# Patient Record
Sex: FEMALE | Race: WHITE | NOT HISPANIC OR LATINO | URBAN - METROPOLITAN AREA
[De-identification: names, ages, dates, MRNs, and addresses within clinical notes are randomized per-mention and may not be internally consistent; named-entity substitution may affect disease eponyms.]

---

## 2022-04-28 ENCOUNTER — OFFICE VISIT (OUTPATIENT)
Dept: URBAN - METROPOLITAN AREA CLINIC 60 | Facility: CLINIC | Age: 42
End: 2022-04-28

## 2022-06-07 ENCOUNTER — OFFICE VISIT (OUTPATIENT)
Dept: URBAN - METROPOLITAN AREA SURGERY CENTER 4 | Facility: SURGERY CENTER | Age: 42
End: 2022-06-07

## 2022-06-13 LAB — PATHOLOGY (INDENTED REPORT): (no result)

## 2022-06-23 ENCOUNTER — OFFICE VISIT (OUTPATIENT)
Dept: URBAN - METROPOLITAN AREA CLINIC 60 | Facility: CLINIC | Age: 42
End: 2022-06-23

## 2022-07-09 ENCOUNTER — TELEPHONE ENCOUNTER (OUTPATIENT)
Dept: URBAN - METROPOLITAN AREA CLINIC 121 | Facility: CLINIC | Age: 42
End: 2022-07-09

## 2022-07-09 RX ORDER — IBUPROFEN 200 MG/1
CAPSULE, LIQUID FILLED ORAL
Refills: 0 | OUTPATIENT
Start: 2022-04-28 | End: 2022-06-23

## 2022-07-09 RX ORDER — LEUPROLIDE ACETATE 11.25 MG
KIT INTRAMUSCULAR
Refills: 0 | OUTPATIENT
Start: 2022-04-28 | End: 2022-06-23

## 2022-07-09 RX ORDER — NORETHINDRONE ACETATE 5 MG/1
TABLET ORAL
Refills: 0 | OUTPATIENT
Start: 2022-04-28 | End: 2022-06-23

## 2022-07-09 RX ORDER — LEUPROLIDE ACETATE 3.75 MG
KIT INTRAMUSCULAR
Refills: 0 | OUTPATIENT
Start: 2022-04-28 | End: 2022-04-28

## 2022-07-09 RX ORDER — PROMETHAZINE HYDROCHLORIDE 25 MG/1
TABLET ORAL
Refills: 0 | OUTPATIENT
Start: 2022-04-28 | End: 2022-06-23

## 2022-07-09 RX ORDER — NORETHINDRONE ACETATE 5 MG/1
TABLET ORAL
Refills: 0 | OUTPATIENT
Start: 2022-06-23 | End: 2022-06-23

## 2022-07-09 RX ORDER — OXYCODONE AND ACETAMINOPHEN 5; 325 MG/1; MG/1
TABLET ORAL
Refills: 0 | OUTPATIENT
Start: 2022-04-28 | End: 2022-04-28

## 2022-07-09 RX ORDER — PANTOPRAZOLE SODIUM 40 MG/1
TWICE A DAY TABLET, DELAYED RELEASE ORAL TWICE A DAY
Refills: 1 | OUTPATIENT
Start: 2022-04-28 | End: 2022-06-19

## 2022-07-09 RX ORDER — LORAZEPAM 1 MG/1
TABLET ORAL
Refills: 0 | OUTPATIENT
Start: 2022-04-28 | End: 2022-06-23

## 2022-07-09 RX ORDER — PANTOPRAZOLE SODIUM 40 MG/1
TABLET, DELAYED RELEASE ORAL
Refills: 0 | OUTPATIENT
Start: 2022-04-28 | End: 2022-06-23

## 2022-07-10 ENCOUNTER — TELEPHONE ENCOUNTER (OUTPATIENT)
Dept: URBAN - METROPOLITAN AREA CLINIC 121 | Facility: CLINIC | Age: 42
End: 2022-07-10

## 2022-07-10 RX ORDER — IBUPROFEN 200 MG/1
CAPSULE, LIQUID FILLED ORAL
Refills: 0 | Status: ACTIVE | COMMUNITY
Start: 2022-06-23

## 2022-07-10 RX ORDER — PANTOPRAZOLE SODIUM 40 MG/1
ONCE A DAY TABLET, DELAYED RELEASE ORAL ONCE A DAY
Refills: 1 | Status: ACTIVE | COMMUNITY
Start: 2022-06-19

## 2022-07-10 RX ORDER — LORAZEPAM 1 MG/1
TABLET ORAL
Refills: 0 | Status: ACTIVE | COMMUNITY
Start: 2022-06-23

## 2022-07-10 RX ORDER — PANTOPRAZOLE SODIUM 40 MG/1
TWICE A DAY 30-60 MINUTES BEFORE BREAKFAST, AND AT BEDTIME TABLET, DELAYED RELEASE ORAL TWICE A DAY
Refills: 4 | Status: ACTIVE | COMMUNITY
Start: 2022-04-28

## 2022-07-10 RX ORDER — LEUPROLIDE ACETATE 11.25 MG
KIT INTRAMUSCULAR
Refills: 0 | Status: ACTIVE | COMMUNITY
Start: 2022-06-23

## 2022-07-10 RX ORDER — PROMETHAZINE HYDROCHLORIDE 25 MG/1
TABLET ORAL
Refills: 0 | Status: ACTIVE | COMMUNITY
Start: 2022-06-23

## 2022-12-22 ENCOUNTER — OFFICE VISIT (OUTPATIENT)
Dept: URBAN - METROPOLITAN AREA CLINIC 60 | Facility: CLINIC | Age: 42
End: 2022-12-22
Payer: COMMERCIAL

## 2022-12-22 ENCOUNTER — WEB ENCOUNTER (OUTPATIENT)
Dept: URBAN - METROPOLITAN AREA CLINIC 60 | Facility: CLINIC | Age: 42
End: 2022-12-22

## 2022-12-22 VITALS
HEART RATE: 102 BPM | OXYGEN SATURATION: 97 % | WEIGHT: 208 LBS | TEMPERATURE: 98.1 F | SYSTOLIC BLOOD PRESSURE: 122 MMHG | DIASTOLIC BLOOD PRESSURE: 78 MMHG | HEIGHT: 64 IN | BODY MASS INDEX: 35.51 KG/M2

## 2022-12-22 DIAGNOSIS — R10.13 DYSPEPSIA: ICD-10-CM

## 2022-12-22 DIAGNOSIS — R10.84 GENERALIZED ABDOMINAL PAIN: ICD-10-CM

## 2022-12-22 DIAGNOSIS — R19.7 DIARRHEA, UNSPECIFIED TYPE: ICD-10-CM

## 2022-12-22 PROCEDURE — 99214 OFFICE O/P EST MOD 30 MIN: CPT | Performed by: NURSE PRACTITIONER

## 2022-12-22 RX ORDER — LORAZEPAM 1 MG/1
TABLET ORAL
Refills: 0 | Status: ACTIVE | COMMUNITY
Start: 2022-06-23

## 2022-12-22 RX ORDER — PANTOPRAZOLE SODIUM 40 MG/1
TWICE A DAY 30-60 MINUTES BEFORE BREAKFAST, AND AT BEDTIME TABLET, DELAYED RELEASE ORAL TWICE A DAY
Refills: 4 | Status: ON HOLD | COMMUNITY
Start: 2022-04-28

## 2022-12-22 RX ORDER — DICYCLOMINE HYDROCHLORIDE 10 MG/1
1 CAPSULE CAPSULE ORAL
Qty: 120 CAPSULES | Refills: 1 | OUTPATIENT
Start: 2022-12-22 | End: 2023-02-20

## 2022-12-22 RX ORDER — PROMETHAZINE HYDROCHLORIDE 25 MG/1
TABLET ORAL
Refills: 0 | Status: ACTIVE | COMMUNITY
Start: 2022-06-23

## 2022-12-22 RX ORDER — LEUPROLIDE ACETATE 11.25 MG
KIT INTRAMUSCULAR
Refills: 0 | Status: ACTIVE | COMMUNITY
Start: 2022-06-23

## 2022-12-22 RX ORDER — IBUPROFEN 200 MG/1
CAPSULE, LIQUID FILLED ORAL
Refills: 0 | Status: ACTIVE | COMMUNITY
Start: 2022-06-23

## 2022-12-22 RX ORDER — PANTOPRAZOLE SODIUM 40 MG/1
1 TABLET TABLET, DELAYED RELEASE ORAL ONCE A DAY
Qty: 90 | Refills: 1 | OUTPATIENT
Start: 2022-12-22

## 2022-12-22 NOTE — HPI-TODAY'S VISIT:
Ms. Herrera is a pleasant 42-year-old female evaluated with complaint of diarrhea.  She was previously evaluated 06/23/2022 in follow-up of upper endoscopy. She was asymptomatic while taking pantoprazole 40 mg once daily.  Today, she complains of diarrhea x2 months having up to 6 BM daily, will have one solid stool per week, acid reflux, and discomfort when swallowing food.  Has seen mucus in stool.  Start of loose stool happened about the same time as she ran out of pantoprazole and started taking doxycycline BID for skin condition.   Denies nocturnal symptoms.   Admits abdominal cramping/spasms followed by diarrhea.  Has been taking ibuprofen 600-800 mg BID for the past 2 weeks.   History of nausea and occasional vomiting, worse in the morning.  Hot flashes prior to nausea.  Started pantoprazole 40 mg one week earlier. Taking ibuprofen or Aleve daily, occasional carbonated beverages, social use of alcohol. Drinking about 48 oz of water daily.  She was under enormous stress.  Using marijuana most nights to help with sleep.

## 2022-12-22 NOTE — HPI-PREVIOUS IMAGING
CT abdomen/pelvis without contrast 04/20/2022 Impression: No evidence of acute abdominal or pelvic pathology.

## 2022-12-22 NOTE — HPI-PREVIOUS PROCEDURES
[place]oscopy 06/07/2022 [place] [facility] [place]: Normal esophagus. 1 cm hiatal hernia. Acute erosive gastritis. Normal examined duodenum. Pathology findings: Duodenal mucosa without histological diagnosis. Body and antral type mucosa without histopathological diagnosis, H. pylori negative. Esophagus having squamous esophageal mucosa, negative for eosinophils, neutrophils or dysplasia.   Colonoscopy at age 27 due to bloating and abdominal pain, no unusual findings.

## 2023-01-13 ENCOUNTER — ERX REFILL RESPONSE (OUTPATIENT)
Dept: URBAN - METROPOLITAN AREA CLINIC 60 | Facility: CLINIC | Age: 43
End: 2023-01-13

## 2023-01-13 RX ORDER — DICYCLOMINE HYDROCHLORIDE 10 MG/1
TAKE 1 CAPSULE 4 TIMES A DAY CAPSULE ORAL
Qty: 120 CAPSULE | Refills: 1 | OUTPATIENT

## 2023-01-13 RX ORDER — DICYCLOMINE HYDROCHLORIDE 10 MG/1
1 CAPSULE CAPSULE ORAL
Qty: 120 CAPSULES | Refills: 1 | OUTPATIENT

## 2023-01-29 ENCOUNTER — ERX REFILL RESPONSE (OUTPATIENT)
Dept: URBAN - METROPOLITAN AREA CLINIC 60 | Facility: CLINIC | Age: 43
End: 2023-01-29

## 2023-01-29 RX ORDER — DICYCLOMINE HYDROCHLORIDE 10 MG/1
TAKE 1 CAPSULE 4 TIMES A DAY CAPSULE ORAL
Qty: 120 CAPSULE | Refills: 1 | OUTPATIENT

## 2023-01-29 RX ORDER — DICYCLOMINE HYDROCHLORIDE 10 MG/1
TAKE 1 CAPSULE CAPSULE ORAL
Qty: 360 CAPSULE | Refills: 0 | OUTPATIENT

## 2023-02-14 ENCOUNTER — TELEPHONE ENCOUNTER (OUTPATIENT)
Dept: URBAN - METROPOLITAN AREA CLINIC 60 | Facility: CLINIC | Age: 43
End: 2023-02-14

## 2023-02-14 RX ORDER — ONDANSETRON 4 MG/1
1 TABLET ON THE TONGUE AND ALLOW TO DISSOLVE  AS NEEDED TABLET, ORALLY DISINTEGRATING ORAL
Qty: 28 TABLET | Refills: 0 | OUTPATIENT
Start: 2023-02-14

## 2023-03-16 ENCOUNTER — TELEPHONE ENCOUNTER (OUTPATIENT)
Dept: URBAN - METROPOLITAN AREA CLINIC 7 | Facility: CLINIC | Age: 43
End: 2023-03-16

## 2023-03-16 ENCOUNTER — TELEPHONE ENCOUNTER (OUTPATIENT)
Dept: URBAN - METROPOLITAN AREA CLINIC 60 | Facility: CLINIC | Age: 43
End: 2023-03-16

## 2023-03-24 ENCOUNTER — OFFICE VISIT (OUTPATIENT)
Dept: URBAN - METROPOLITAN AREA CLINIC 63 | Facility: CLINIC | Age: 43
End: 2023-03-24
Payer: COMMERCIAL

## 2023-03-24 VITALS
DIASTOLIC BLOOD PRESSURE: 88 MMHG | TEMPERATURE: 97.9 F | BODY MASS INDEX: 34.49 KG/M2 | OXYGEN SATURATION: 98 % | HEART RATE: 87 BPM | WEIGHT: 202 LBS | HEIGHT: 64 IN | SYSTOLIC BLOOD PRESSURE: 132 MMHG

## 2023-03-24 DIAGNOSIS — R11.0 NAUSEA: ICD-10-CM

## 2023-03-24 DIAGNOSIS — R11.15 CYCLIC VOMITING SYNDROME: ICD-10-CM

## 2023-03-24 DIAGNOSIS — K52.9 ENTERITIS: ICD-10-CM

## 2023-03-24 PROCEDURE — 99214 OFFICE O/P EST MOD 30 MIN: CPT | Performed by: INTERNAL MEDICINE

## 2023-03-24 RX ORDER — SERTRALINE HYDROCHLORIDE 25 MG/1
1 TABLET TABLET, FILM COATED ORAL ONCE A DAY
Status: ACTIVE | COMMUNITY

## 2023-03-24 RX ORDER — LORAZEPAM 1 MG/1
TABLET ORAL
Refills: 0 | Status: DISCONTINUED | COMMUNITY
Start: 2022-06-23

## 2023-03-24 RX ORDER — IBUPROFEN 200 MG/1
CAPSULE, LIQUID FILLED ORAL
Refills: 0 | Status: DISCONTINUED | COMMUNITY
Start: 2022-06-23

## 2023-03-24 RX ORDER — PROMETHAZINE HYDROCHLORIDE 25 MG/1
TABLET ORAL
Refills: 0 | Status: DISCONTINUED | COMMUNITY
Start: 2022-06-23

## 2023-03-24 RX ORDER — KETOROLAC TROMETHAMINE 10 MG/1
1 TABLET WITH FOOD OR MILK AS NEEDED TABLET, FILM COATED ORAL
Status: ACTIVE | COMMUNITY

## 2023-03-24 RX ORDER — ONDANSETRON 4 MG/1
1 TABLET ON THE TONGUE AND ALLOW TO DISSOLVE  AS NEEDED TABLET, ORALLY DISINTEGRATING ORAL
Qty: 28 TABLET | Refills: 0 | Status: DISCONTINUED | COMMUNITY
Start: 2023-02-14

## 2023-03-24 RX ORDER — LEUPROLIDE ACETATE 11.25 MG
KIT INTRAMUSCULAR
Refills: 0 | Status: DISCONTINUED | COMMUNITY
Start: 2022-06-23

## 2023-03-24 RX ORDER — DICYCLOMINE HYDROCHLORIDE 10 MG/1
TAKE 1 CAPSULE CAPSULE ORAL
Qty: 360 CAPSULE | Refills: 0 | Status: DISCONTINUED | COMMUNITY

## 2023-03-24 RX ORDER — PANTOPRAZOLE SODIUM 40 MG/1
1 TABLET TABLET, DELAYED RELEASE ORAL ONCE A DAY
Qty: 90 | Refills: 1 | Status: ACTIVE | COMMUNITY
Start: 2022-12-22

## 2023-03-24 NOTE — HPI-TODAY'S VISIT:
Patient complains of vomiting, diarrhea, after eating quinoa salad, 3/12/23. Went to ER on 3/16. Off and on symptoms.  Diarrhea once this week, vomiting twice. Eating toast and soup only. Stool looks like marbles. +Nausea, achy, chills, but took her temp, no fever. Takes pantoprazole for heartburn symptoms. Rarely takes marijuana, twice in the last month, used mainly to help her sleep. Zofran does not help her and makes her vomit. Takes ibuprofen and toradol for pelvic pain. She has been recommended to have hysterectomy but she refuses.

## 2023-03-30 ENCOUNTER — TELEPHONE ENCOUNTER (OUTPATIENT)
Dept: URBAN - METROPOLITAN AREA CLINIC 63 | Facility: CLINIC | Age: 43
End: 2023-03-30

## 2023-04-03 ENCOUNTER — OFFICE VISIT (OUTPATIENT)
Dept: URBAN - METROPOLITAN AREA CLINIC 60 | Facility: CLINIC | Age: 43
End: 2023-04-03
Payer: COMMERCIAL

## 2023-04-03 ENCOUNTER — WEB ENCOUNTER (OUTPATIENT)
Dept: URBAN - METROPOLITAN AREA CLINIC 60 | Facility: CLINIC | Age: 43
End: 2023-04-03

## 2023-04-03 VITALS
HEIGHT: 64 IN | OXYGEN SATURATION: 97 % | HEART RATE: 91 BPM | WEIGHT: 201.2 LBS | DIASTOLIC BLOOD PRESSURE: 82 MMHG | SYSTOLIC BLOOD PRESSURE: 126 MMHG | TEMPERATURE: 97.9 F | BODY MASS INDEX: 34.35 KG/M2

## 2023-04-03 DIAGNOSIS — R10.13 EPIGASTRIC PAIN: ICD-10-CM

## 2023-04-03 DIAGNOSIS — R19.7 DIARRHEA OF PRESUMED INFECTIOUS ORIGIN: ICD-10-CM

## 2023-04-03 PROCEDURE — 99214 OFFICE O/P EST MOD 30 MIN: CPT | Performed by: PHYSICIAN ASSISTANT

## 2023-04-03 RX ORDER — HYDROCODONE BITARTRATE AND ACETAMINOPHEN 7.5; 325 MG/1; MG/1
TABLET ORAL
Qty: 60 TABLET | Status: ACTIVE | COMMUNITY

## 2023-04-03 RX ORDER — KETOROLAC TROMETHAMINE 10 MG/1
1 TABLET WITH FOOD OR MILK AS NEEDED TABLET, FILM COATED ORAL
Status: ACTIVE | COMMUNITY

## 2023-04-03 RX ORDER — SERTRALINE HYDROCHLORIDE 25 MG/1
1 TABLET TABLET, FILM COATED ORAL ONCE A DAY
Status: ACTIVE | COMMUNITY

## 2023-04-03 RX ORDER — KETOROLAC TROMETHAMINE 10 MG/1
1 TABLET WITH FOOD OR MILK AS NEEDED TABLET, FILM COATED ORAL
OUTPATIENT

## 2023-04-03 RX ORDER — PANTOPRAZOLE SODIUM 40 MG/1
1 TABLET TABLET, DELAYED RELEASE ORAL ONCE A DAY
Qty: 90 | Refills: 1 | Status: ACTIVE | COMMUNITY
Start: 2022-12-22

## 2023-04-03 RX ORDER — NORETHINDRONE 0.35 MG/1
TAKE 1 TABLET BY MOUTH EVERY DAY TABLET ORAL
Qty: 84 EACH | Refills: 0 | Status: ACTIVE | COMMUNITY

## 2023-04-03 NOTE — HPI-HPI
Lilia is a 43 year old female with history of GERD and gastritis, uterine fibroids, heavy periods who presents today for complaints of epigastric discomfort and diarrhea for 3 weeks. Had diarrhea and stomach pains after eating a quinoa salad 3 weeks ago. Had diarrhea about 3 months ago and saw [facility], he thought diarrhea was secondary to antibiotic which she was taking for a skin rash which has been stopped. 3 weeks ago she developed severe nausea, vomiting and diarrhea the day she ate the salad and felt she had food poisoning. Until this weekend she has been having 6 bowel movements a day, with [place] type 6, light tan with mucous. Has epigastric discomfort with eating.  Before eating, she feels nausea that is relieved with food intake, and then after eating she gets epigastric discomfort. The pain is not a stabbing pain, just uncomfortable. She has been eating a limited diet of bland foods and whenever she tries something "normal" like milk and cereal, she has worsening diarrhea. She notices this happens with dairy products. Denies ETOH, does not smoke cigarrettes. Uses marijuana once in a blue moon. Takes ibuprofen for pelvic cramps which are secondary to fibroids. Had endoscopy 1 year ago with Dr. Clifford which showed gastritis. I have reviewed her records including labs and imaging. Labwork performed 3/16/23 showed mildly elevated AST 51, ALT 69 otherwise normal CMP. CBC within normal limits. CT abdomen showed multiple splenic lesions, mesenteric lymph nodes c/w enteritis, uterine fibroids. She was last seen in clinic by Yfn MCCABE on 6/23/22 for review of her EGD findings. She has been taking pantoprazole 40 mg twice daily as prescribed.

## 2023-04-13 LAB
ABSOLUTE BASOPHILS: 87
ABSOLUTE EOSINOPHILS: 320
ABSOLUTE LYMPHOCYTES: 2018
ABSOLUTE MONOCYTES: 640
ABSOLUTE NEUTROPHILS: 6635
BASOPHILS: 0.9
EOSINOPHILS: 3.3
HEMATOCRIT: 37.2
HEMOGLOBIN: 12.9
LYMPHOCYTES: 20.8
MCH: 28.6
MCHC: 34.7
MCV: 82.5
MONOCYTES: 6.6
MPV: 9.1
NEUTROPHILS: 68.4
PLATELET COUNT: 394
RDW: 13.7
RED BLOOD CELL COUNT: 4.51
WHITE BLOOD CELL COUNT: 9.7

## 2023-04-26 ENCOUNTER — OFFICE VISIT (OUTPATIENT)
Dept: URBAN - METROPOLITAN AREA CLINIC 63 | Facility: CLINIC | Age: 43
End: 2023-04-26

## 2023-04-26 RX ORDER — NORETHINDRONE 0.35 MG/1
TAKE 1 TABLET BY MOUTH EVERY DAY TABLET ORAL
Qty: 84 EACH | Refills: 0 | COMMUNITY

## 2023-04-26 RX ORDER — HYDROCODONE BITARTRATE AND ACETAMINOPHEN 7.5; 325 MG/1; MG/1
TABLET ORAL
Qty: 60 TABLET | COMMUNITY

## 2023-04-26 RX ORDER — PANTOPRAZOLE SODIUM 40 MG/1
1 TABLET TABLET, DELAYED RELEASE ORAL ONCE A DAY
Qty: 90 | Refills: 1 | COMMUNITY
Start: 2022-12-22

## 2023-04-26 RX ORDER — SERTRALINE HYDROCHLORIDE 25 MG/1
1 TABLET TABLET, FILM COATED ORAL ONCE A DAY
COMMUNITY

## 2023-04-26 NOTE — HPI-TODAY'S VISIT:
Lilia is a 43-year-old female who is here for a follow-up visit.  She was last seen in clinic on 4/3/2023 for complaints of diarrhea, nausea, and epigastric pain Labs were ordered stool studies and CBC.  Patient is taking pantoprazole 40 mg daily.  She was asked to avoid ibuprofen and other NSAIDs.

## 2023-05-09 LAB
CLOSTRIDIUM DIFFICILE TOXINB,QL REAL TIME PCR: NOT DETECTED
OVA AND PARASITES, CONC AND PERM SMEAR: (no result)

## 2023-05-22 ENCOUNTER — WEB ENCOUNTER (OUTPATIENT)
Dept: URBAN - METROPOLITAN AREA CLINIC 60 | Facility: CLINIC | Age: 43
End: 2023-05-22

## 2023-05-22 ENCOUNTER — OFFICE VISIT (OUTPATIENT)
Dept: URBAN - METROPOLITAN AREA CLINIC 60 | Facility: CLINIC | Age: 43
End: 2023-05-22
Payer: COMMERCIAL

## 2023-05-22 VITALS
OXYGEN SATURATION: 97 % | DIASTOLIC BLOOD PRESSURE: 84 MMHG | WEIGHT: 193.2 LBS | TEMPERATURE: 97.6 F | SYSTOLIC BLOOD PRESSURE: 124 MMHG | BODY MASS INDEX: 32.98 KG/M2 | HEART RATE: 70 BPM | HEIGHT: 64 IN

## 2023-05-22 DIAGNOSIS — R19.7 DIARRHEA, UNSPECIFIED TYPE: ICD-10-CM

## 2023-05-22 DIAGNOSIS — R10.13 EPIGASTRIC PAIN: ICD-10-CM

## 2023-05-22 DIAGNOSIS — R12 HEARTBURN: ICD-10-CM

## 2023-05-22 DIAGNOSIS — R74.8 ELEVATED LIVER ENZYMES: ICD-10-CM

## 2023-05-22 PROCEDURE — 99214 OFFICE O/P EST MOD 30 MIN: CPT | Performed by: PHYSICIAN ASSISTANT

## 2023-05-22 RX ORDER — NORETHINDRONE 0.35 MG/1
TAKE 1 TABLET BY MOUTH EVERY DAY TABLET ORAL
Qty: 84 EACH | Refills: 0 | Status: ACTIVE | COMMUNITY

## 2023-05-22 RX ORDER — KETOROLAC TROMETHAMINE 10 MG/1
TAKE 1 TABLET EVERY 4 HOURS AS NEEDED FOR PAIN TABLET, FILM COATED ORAL
Qty: 20 EACH | Refills: 0 | Status: ACTIVE | COMMUNITY

## 2023-05-22 RX ORDER — KETOROLAC TROMETHAMINE 10 MG/1
TAKE 1 TABLET EVERY 4 HOURS AS NEEDED FOR PAIN TABLET, FILM COATED ORAL
OUTPATIENT

## 2023-05-22 RX ORDER — PANTOPRAZOLE SODIUM 40 MG/1
1 TABLET TABLET, DELAYED RELEASE ORAL ONCE A DAY
Qty: 90 | Refills: 1 | Status: ACTIVE | COMMUNITY
Start: 2022-12-22

## 2023-05-22 RX ORDER — SERTRALINE HYDROCHLORIDE 25 MG/1
1 TABLET TABLET, FILM COATED ORAL ONCE A DAY
Status: ACTIVE | COMMUNITY

## 2023-05-22 NOTE — HPI-TODAY'S VISIT:
Lilia is a 43-year-old female who returns to clinic for a follow-up.  She was last seen on 4/3/2023 for complaint of  diarrhea and epigastric pain.  She was sent for stool studies ova and parasite exam and C. difficile PCR which were both negative.  CBC was within normal limits.  Today she complains of dizziness, taking a steroid which helps improve dizziness. Taking migraine medicine now, propranolol 20 mg daily. Had COVID 4/15/23. She had 2 visits to the ER, found to have pneumonia. Had dizziness and started on meclizine and steroid. She has had 2 courses of oral steroids and 2 injections of kenalog. Brain fog from covid.  She continues to have BMs with loose stool, 4 x /day, with intermittent solid stool. Fiber  - hasnt taken it, but will start. She bought psyllium husk fiber, didn't like the taste even though it was unflavored. Dairy - bought lactate milk, still eating cheese. Pantoprazole twice daily - yes one a day usually,  if she remembers, then twice.  Pepto-Bismol - no Tried Zantac for reflux symptoms. Helped Ibuprofen once in a blue moon. Nausea - yes, nausea and vomiting once yesterday. She states "1/2 my days, I vomit" Diet - no changes Alcohol  - none in the past month.  Immodium, she usually takes 1/2 tab if she has diarrhea. With covid she had liquid diarrhea and had nocturnal diarrhea once. Having night sweats now, sweating all the time not just at night.  Went to the ER twice because her heart was racing on steroids. Steroids make her restless at night. Kenalog 40 mg shot on sunday. taking a break from steroids, was on 40 mg. Steroids are managed by urgent care for now. LFTS elevated on hospital labs 4/28/23: AST 38, ALT 51, Patient states she was taking tylenol for covid symptoms.

## 2023-06-14 LAB
A/G RATIO: 1.6
ALBUMIN: 3.9
ALKALINE PHOSPHATASE: 56
ALT (SGPT): 10
AST (SGOT): 11
BILIRUBIN, TOTAL: 0.3
BUN/CREATININE RATIO: (no result)
BUN: 14
CALCIUM: 9.1
CARBON DIOXIDE, TOTAL: 28
CHLORIDE: 103
CREATININE: 0.76
EGFR: 101
GLOBULIN, TOTAL: 2.4
GLUCOSE: 82
POTASSIUM: 4.4
PROTEIN, TOTAL: 6.3
SODIUM: 140

## 2023-06-20 ENCOUNTER — OFFICE VISIT (OUTPATIENT)
Dept: URBAN - METROPOLITAN AREA CLINIC 60 | Facility: CLINIC | Age: 43
End: 2023-06-20

## 2023-06-20 RX ORDER — PANTOPRAZOLE SODIUM 40 MG/1
1 TABLET TABLET, DELAYED RELEASE ORAL ONCE A DAY
Qty: 90 | Refills: 1 | COMMUNITY
Start: 2022-12-22

## 2023-06-20 RX ORDER — NORETHINDRONE 0.35 MG/1
TAKE 1 TABLET BY MOUTH EVERY DAY TABLET ORAL
Qty: 84 EACH | Refills: 0 | COMMUNITY

## 2023-06-20 RX ORDER — SERTRALINE HYDROCHLORIDE 25 MG/1
1 TABLET TABLET, FILM COATED ORAL ONCE A DAY
COMMUNITY

## 2023-06-22 ENCOUNTER — ERX REFILL RESPONSE (OUTPATIENT)
Dept: URBAN - METROPOLITAN AREA CLINIC 60 | Facility: CLINIC | Age: 43
End: 2023-06-22

## 2023-06-22 RX ORDER — PANTOPRAZOLE SODIUM 40 MG/1
1 TABLET TABLET, DELAYED RELEASE ORAL ONCE A DAY
Qty: 90 | Refills: 1 | OUTPATIENT

## 2023-06-22 RX ORDER — PANTOPRAZOLE SODIUM 40 MG/1
TAKE 1 TABLET BY MOUTH EVERY DAY FOR 90 DAYS TABLET, DELAYED RELEASE ORAL
Qty: 90 TABLET | Refills: 1 | OUTPATIENT

## 2023-11-06 ENCOUNTER — OFFICE VISIT (OUTPATIENT)
Dept: URBAN - METROPOLITAN AREA CLINIC 60 | Facility: CLINIC | Age: 43
End: 2023-11-06
Payer: COMMERCIAL

## 2023-11-06 VITALS
BODY MASS INDEX: 30.39 KG/M2 | SYSTOLIC BLOOD PRESSURE: 122 MMHG | HEART RATE: 91 BPM | HEIGHT: 64 IN | DIASTOLIC BLOOD PRESSURE: 78 MMHG | TEMPERATURE: 98.1 F | OXYGEN SATURATION: 99 % | WEIGHT: 178 LBS

## 2023-11-06 DIAGNOSIS — R10.9 ABDOMINAL PAIN, UNSPECIFIED ABDOMINAL LOCATION: ICD-10-CM

## 2023-11-06 DIAGNOSIS — R11.2 NAUSEA WITH VOMITING, UNSPECIFIED: ICD-10-CM

## 2023-11-06 PROCEDURE — 99214 OFFICE O/P EST MOD 30 MIN: CPT

## 2023-11-06 RX ORDER — PANTOPRAZOLE SODIUM 40 MG/1
1 TABLET TABLET, DELAYED RELEASE ORAL ONCE A DAY
Qty: 90 TABLET | Refills: 1 | OUTPATIENT
Start: 2023-11-06

## 2023-11-06 RX ORDER — SERTRALINE HYDROCHLORIDE 25 MG/1
1 TABLET TABLET, FILM COATED ORAL ONCE A DAY
Status: ACTIVE | COMMUNITY

## 2023-11-06 RX ORDER — ONDANSETRON HYDROCHLORIDE 4 MG/1
1 TABLET TABLET, FILM COATED ORAL ONCE A DAY
Qty: 30 | Status: ACTIVE | COMMUNITY
Start: 2023-11-06

## 2023-11-06 RX ORDER — PANTOPRAZOLE SODIUM 40 MG/1
TAKE 1 TABLET BY MOUTH EVERY DAY FOR 90 DAYS TABLET, DELAYED RELEASE ORAL
Qty: 90 TABLET | Refills: 1 | Status: ACTIVE | COMMUNITY

## 2023-11-06 RX ORDER — ONDANSETRON 8 MG/1
1 TABLET ON THE TONGUE AND ALLOW TO DISSOLVE AS NEEDED TABLET, ORALLY DISINTEGRATING ORAL EVERY 8 HOURS
Qty: 90 | Refills: 1 | OUTPATIENT
Start: 2023-11-06

## 2023-11-06 RX ORDER — HYDROCODONE BITARTRATE AND ACETAMINOPHEN 7.5; 325 MG/1; MG/1
TAKE 1-2 TABLETS DAILY TABLET ORAL
Qty: 60 EACH | Refills: 0 | Status: ACTIVE | COMMUNITY

## 2023-11-06 NOTE — HPI-TODAY'S VISIT:
Lilia is a 43-year-old female presenting today for evaluation of nausea/vomiting and abdominal pain. She states that when she made the visit she was having a lot of problems with nausea and abdominal pain. She was also vomiting every so often. However, for the last week or so symptoms have been much better. She does have a history of severe endometriosis and has an upcoming appointment with an endometriosis specialist for hysterectomy discussion. She is currently seeking IVF and if this does not work out then she states she will proceed with hysterectomy. She describes her pain as mostly lower abdominal/pelvic spasm/cramp/sharp pain. She states that last month she was getting nauseous almost daily for 3 weeks however, for the last 4 days she has not been nauseous at all. She is doing Zofran 8 mg. She states Zofran 4 mg provided her little to no relief but 8 mg seems to work well. Her neurologist did start her on magnesium which gave her diarrhea and increased pain and bloating. Since stopping the magnesium she has had decreased pain, decreased bloating and no diarrhea. She does take ibuprofen often. She states that since starting pantoprazole she has had decreased reflux symptoms. She denies fever, changes in weight, anorexia, hematemesis, dysphagia, dyne aphasia, diarrhea/constipation/change in bowel habits, hematochezia, melena, blood when wiping.

## 2024-01-16 ENCOUNTER — TELEPHONE ENCOUNTER (OUTPATIENT)
Dept: URBAN - METROPOLITAN AREA CLINIC 60 | Facility: CLINIC | Age: 44
End: 2024-01-16

## 2024-01-24 LAB — H PYLORI BREATH TEST: NOT DETECTED

## 2024-01-29 ENCOUNTER — TELEPHONE ENCOUNTER (OUTPATIENT)
Dept: URBAN - METROPOLITAN AREA CLINIC 63 | Facility: CLINIC | Age: 44
End: 2024-01-29

## 2024-02-07 ENCOUNTER — LAB (OUTPATIENT)
Dept: URBAN - METROPOLITAN AREA CLINIC 63 | Facility: CLINIC | Age: 44
End: 2024-02-07

## 2024-02-07 ENCOUNTER — OV EP (OUTPATIENT)
Dept: URBAN - METROPOLITAN AREA CLINIC 63 | Facility: CLINIC | Age: 44
End: 2024-02-07
Payer: COMMERCIAL

## 2024-02-07 VITALS
WEIGHT: 187 LBS | RESPIRATION RATE: 20 BRPM | SYSTOLIC BLOOD PRESSURE: 120 MMHG | DIASTOLIC BLOOD PRESSURE: 62 MMHG | HEART RATE: 97 BPM | BODY MASS INDEX: 31.92 KG/M2 | HEIGHT: 64 IN | TEMPERATURE: 97.3 F | OXYGEN SATURATION: 99 %

## 2024-02-07 DIAGNOSIS — N80.9 ENDOMETRIOSIS: ICD-10-CM

## 2024-02-07 DIAGNOSIS — R10.84 GENERALIZED ABDOMINAL PAIN: ICD-10-CM

## 2024-02-07 DIAGNOSIS — D73.89 SPLENIC LESION: ICD-10-CM

## 2024-02-07 DIAGNOSIS — R11.2 NAUSEA AND VOMITING, UNSPECIFIED VOMITING TYPE: ICD-10-CM

## 2024-02-07 PROBLEM — 266435005: Status: ACTIVE | Noted: 2024-02-07

## 2024-02-07 PROBLEM — 129103003: Status: ACTIVE | Noted: 2024-02-07

## 2024-02-07 PROCEDURE — 99214 OFFICE O/P EST MOD 30 MIN: CPT

## 2024-02-07 RX ORDER — PANTOPRAZOLE SODIUM 40 MG/1
1 TABLET TABLET, DELAYED RELEASE ORAL ONCE A DAY
Qty: 90 TABLET | Refills: 1 | Status: ACTIVE | COMMUNITY
Start: 2023-11-06

## 2024-02-07 RX ORDER — FAMOTIDINE 40 MG/1
1 TABLET AT BEDTIME TABLET, FILM COATED ORAL ONCE A DAY
Qty: 90 TABLET | Refills: 0 | OUTPATIENT
Start: 2024-02-07

## 2024-02-07 RX ORDER — ONDANSETRON 8 MG/1
1 TABLET ON THE TONGUE AND ALLOW TO DISSOLVE AS NEEDED TABLET, ORALLY DISINTEGRATING ORAL EVERY 8 HOURS
Qty: 90 | Refills: 1 | Status: ACTIVE | COMMUNITY
Start: 2023-11-06

## 2024-02-07 RX ORDER — HYDROCODONE BITARTRATE AND ACETAMINOPHEN 7.5; 325 MG/1; MG/1
TAKE 1-2 TABLETS DAILY TABLET ORAL
Qty: 60 EACH | Refills: 0 | Status: ACTIVE | COMMUNITY

## 2024-02-07 RX ORDER — SERTRALINE HYDROCHLORIDE 25 MG/1
1 TABLET TABLET, FILM COATED ORAL ONCE A DAY
Status: ACTIVE | COMMUNITY

## 2024-02-07 RX ORDER — ONDANSETRON HYDROCHLORIDE 4 MG/1
1 TABLET TABLET, FILM COATED ORAL ONCE A DAY
Qty: 30 | Status: ACTIVE | COMMUNITY
Start: 2023-11-06

## 2024-02-07 NOTE — HPI-TODAY'S VISIT:
Lilia is a 43-year-old female who presents to the office today for follow-up on abdominal pain and nausea/vomiting. Her nausea and vomiting has improved and she has had minimal nausea for the past 2 weeks and has only vomited 1 time in the past 2 weeks as well. She did notice that certain foods/drinks would make symptoms worse such as spicy/acidic foods. She continues to have some abdominal discomfort when she gets GERD symptoms but the pain has not been as bad. GERD has improved with pantoprazole once daily in the morning but she is still having some symptoms. She has also been following with a fertility specialist for years and recently found out that she is not fertile and will be having a hysterectomy and this has caused a great deal of stress for her. She also has a history of endometriosis that has caused a lot of pain in the past and is seeing an endometrial specialist in [place] next week for discussion of treatment options. Otherwise, she has no GI complaints, questions, concerns. She denies melena, hematochezia, bright red blood per rectum, hematemesis, unintentional weight loss, pain radiating to the back, change in bowel habits, change in stool caliber. . . . 1/20/2024 H. pylori breath test negative . 1/16/2024 labs; - CBC; WBC 11.3, immature granulocytes 1.10, absolute neutrophils 8.4 - CMP within normal limits . 6/14/2023 CMP within normal limits . 5/9/2023 stool O&P within normal limits and C. difficile negative . 4/13/2023 CBC within normal limits . 1/16/2024 CT abdomen and pelvis; - Impression; - No acute abnormality appreciated. Redemonstration of heterogenous fibroid uterus. Also similar appearance of multiple hypodense lesions in the spleen of uncertain etiology; consider nonemergent follow-up MRI for further characterization as clinically directed . EGD 6/7/2022; - Normal esophagus. Biopsied. - 1 cm hiatal hernia. - Acute erosive gastritis. Biopsied. - Normal examined duodenum. Biopsied. - Pathology; - Second part duodenum biopsy; small bowel mucosa without histopathologic diagnosis. No evidence of villous atrophy or sprue - Stomach antrum biopsy; body and antral type mucosa without histopathologic diagnosis. Stain negative for H. pylori - Esophagus random biopsy; squamous esophageal mucosa. Negative for intraepithelial eosinophils, neutrophils or dysplasia.

## 2024-02-19 ENCOUNTER — OV EP (OUTPATIENT)
Dept: URBAN - METROPOLITAN AREA CLINIC 60 | Facility: CLINIC | Age: 44
End: 2024-02-19

## 2024-02-23 ENCOUNTER — LAB (OUTPATIENT)
Dept: URBAN - METROPOLITAN AREA CLINIC 63 | Facility: CLINIC | Age: 44
End: 2024-02-23

## 2024-02-26 ENCOUNTER — OV EP (OUTPATIENT)
Dept: URBAN - METROPOLITAN AREA CLINIC 60 | Facility: CLINIC | Age: 44
End: 2024-02-26

## 2024-04-09 ENCOUNTER — OV EP (OUTPATIENT)
Dept: URBAN - METROPOLITAN AREA CLINIC 63 | Facility: CLINIC | Age: 44
End: 2024-04-09
Payer: COMMERCIAL

## 2024-04-09 VITALS
SYSTOLIC BLOOD PRESSURE: 140 MMHG | DIASTOLIC BLOOD PRESSURE: 80 MMHG | HEIGHT: 64 IN | HEART RATE: 67 BPM | OXYGEN SATURATION: 98 % | TEMPERATURE: 97.9 F | WEIGHT: 183.4 LBS | BODY MASS INDEX: 31.31 KG/M2

## 2024-04-09 DIAGNOSIS — K21.9 GERD WITHOUT ESOPHAGITIS: ICD-10-CM

## 2024-04-09 DIAGNOSIS — K76.0 FATTY LIVER: ICD-10-CM

## 2024-04-09 DIAGNOSIS — D73.89 SPLENIC LESION: ICD-10-CM

## 2024-04-09 DIAGNOSIS — R11.2 NAUSEA AND VOMITING IN ADULT: ICD-10-CM

## 2024-04-09 PROBLEM — 197321007: Status: ACTIVE | Noted: 2024-04-09

## 2024-04-09 PROCEDURE — 99214 OFFICE O/P EST MOD 30 MIN: CPT

## 2024-04-09 RX ORDER — FAMOTIDINE 40 MG/1
1 TABLET AT BEDTIME TABLET, FILM COATED ORAL ONCE A DAY
Qty: 90 TABLET | Refills: 0 | Status: ACTIVE | COMMUNITY
Start: 2024-02-07

## 2024-04-09 RX ORDER — SUMATRIPTAN SUCCINATE 100 MG/1
1 TABLET AT LEAST 2 HOURS BETWEEN DOSES AS NEEDED TABLET, FILM COATED ORAL TWICE A DAY
Status: ACTIVE | COMMUNITY

## 2024-04-09 RX ORDER — PANTOPRAZOLE SODIUM 40 MG/1
1 TABLET TABLET, DELAYED RELEASE ORAL ONCE A DAY
Qty: 90 TABLET | Refills: 1 | Status: ACTIVE | COMMUNITY
Start: 2023-11-06

## 2024-04-09 RX ORDER — SERTRALINE HYDROCHLORIDE 25 MG/1
1 TABLET TABLET, FILM COATED ORAL ONCE A DAY
Status: ACTIVE | COMMUNITY

## 2024-04-09 RX ORDER — HYDROCODONE BITARTRATE AND ACETAMINOPHEN 7.5; 325 MG/1; MG/1
TAKE 1-2 TABLETS DAILY TABLET ORAL
Qty: 60 EACH | Refills: 0 | Status: ACTIVE | COMMUNITY

## 2024-04-09 RX ORDER — ONDANSETRON 8 MG/1
1 TABLET ON THE TONGUE AND ALLOW TO DISSOLVE AS NEEDED TABLET, ORALLY DISINTEGRATING ORAL EVERY 8 HOURS
Qty: 90 | Refills: 1 | Status: ACTIVE | COMMUNITY
Start: 2023-11-06

## 2024-04-09 NOTE — HPI-TODAY'S VISIT:
Lilia is a 44-year-old female presenting to the office today for follow-up on abdominal pain and nausea/vomiting. She states that symptoms have been much improved since she started adhering to lifestyle modifications for rapid gastric emptying. She notes she is having much less nausea and has had almost no vomiting except for yesterday when she was stressed from work. She is having nausea 2-3 times per week. GERD has been under very good control with pantoprazole once daily in the morning. Otherwise, she has no GI complaints, questions, concerns at this time. She did end up seeing the endometrial specialist in [place] who referred her to pain management and now she is getting injections with the new pain management doctor rather than being on long-term medication. She was also made aware that she may have fractured her tailbone in the past and has an upcoming x-ray for this. She continues to have lots of stress from work but otherwise states she is doing well. She denies melena, hematochezia, bright red blood per rectum, unintentional weight loss/weight gain, change in bowel habits, change in stool caliber, hematemesis. . EGD 6/7/2022; - Normal esophagus. Biopsied. - 1 cm hiatal hernia. - Acute erosive gastritis. Biopsied. - Normal examined duodenum. Biopsied. - Pathology; - Second part duodenum biopsy; small bowel mucosa without histopathologic diagnosis. No evidence of villous atrophy or sprue - Stomach antrum biopsy; body and antral type mucosa without histopathologic diagnosis. Stain negative for H. pylori - Esophagus random biopsy; squamous esophageal mucosa. Negative for intraepithelial eosinophils, neutrophils or dysplasia . MRI abdomen and pelvis with/without contrast 2/28/2024; - Impression; 1. Splenic benign hemangiomas measuring up to 1 cm 2. Cholecystectomy. No biliary ductal dilation or acute abnormality 3. Mild hepatic steatosis 4. Benign subcentimeter cortical renal cysts 5. Morphologic changes of adenomyosis of the uterus with several intramuscular, subserosal fibroids identified as described above 6. Endometriosis of the bilateral ovaries measuring up to 1 cm on the right, 0.8 cm on the left 7. Arcuate configuration of the endometrial cavity, normal variant. No uterine septum or other abnormality . Gastric emptying scan 3/12/2024; - Impression; 1. Rapid gastric emptying . 1/16/2024 CT abdomen and pelvis; - Impression; - No acute abnormality appreciated. Redemonstration of heterogenous fibroid uterus. Also similar appearance of multiple hypodense lesions in the spleen of uncertain etiology; consider nonemergent follow-up MRI for further characterization as clinically directed . 2/23/2024 labs; - CBC; platelet count 456, otherwise within normal limits - CMP; anion gap 6, otherwise within normal limits . 1/20/2024 H. pylori breath test negative . 1/16/2024 labs; - CBC; WBC 11.3, immature granulocytes 1.10, absolute neutrophils 8.4 - CMP within normal limits . 6/14/2023 CMP within normal limits . 5/9/2023 stool O&P within normal limits and C. difficile negative . 4/13/2023 CBC within normal limits

## 2024-04-10 PROBLEM — 8493009: Status: ACTIVE | Noted: 2024-04-10

## 2024-04-16 ENCOUNTER — LAB (OUTPATIENT)
Dept: URBAN - METROPOLITAN AREA CLINIC 63 | Facility: CLINIC | Age: 44
End: 2024-04-16

## 2024-04-24 LAB
A/G RATIO: 1.8
ABSOLUTE BASOPHILS: 94
ABSOLUTE EOSINOPHILS: 196
ABSOLUTE LYMPHOCYTES: 2159
ABSOLUTE MONOCYTES: 587
ABSOLUTE NEUTROPHILS: 5466
ALBUMIN: 4.3
ALKALINE PHOSPHATASE: 43
ALT (SGPT): 14
AST (SGOT): 14
BASOPHILS: 1.1
BILIRUBIN, TOTAL: 0.5
BUN/CREATININE RATIO: (no result)
BUN: 7
CALCIUM: 9.4
CARBON DIOXIDE, TOTAL: 29
CHLORIDE: 103
CREATININE: 0.69
EGFR: 111
EOSINOPHILS: 2.3
GLOBULIN, TOTAL: 2.4
GLUCOSE: 106
HEMATOCRIT: 39.2
HEMOGLOBIN: 13.1
HEP B CORE AB, IGM: (no result)
HEPATITIS A AB, TOTAL: REACTIVE
HEPATITIS B SURFACE ANTIGEN: (no result)
LYMPHOCYTES: 25.4
MCH: 28.7
MCHC: 33.4
MCV: 86
MONOCYTES: 6.9
MPV: 9.5
NEUTROPHILS: 64.3
PLATELET COUNT: 457
POTASSIUM: 4.5
PROTEIN, TOTAL: 6.7
RDW: 13.9
RED BLOOD CELL COUNT: 4.56
SODIUM: 139
WHITE BLOOD CELL COUNT: 8.5

## 2024-08-28 ENCOUNTER — OFFICE VISIT (OUTPATIENT)
Dept: URBAN - METROPOLITAN AREA CLINIC 63 | Facility: CLINIC | Age: 44
End: 2024-08-28

## 2024-11-27 ENCOUNTER — TELEPHONE ENCOUNTER (OUTPATIENT)
Dept: URBAN - METROPOLITAN AREA CLINIC 63 | Facility: CLINIC | Age: 44
End: 2024-11-27

## 2024-11-27 RX ORDER — FAMOTIDINE 40 MG/1
1 TABLET AT BEDTIME TABLET, FILM COATED ORAL ONCE A DAY
Qty: 90 TABLET | Refills: 0
Start: 2024-02-07